# Patient Record
Sex: MALE | Race: WHITE | Employment: FULL TIME | URBAN - NONMETROPOLITAN AREA
[De-identification: names, ages, dates, MRNs, and addresses within clinical notes are randomized per-mention and may not be internally consistent; named-entity substitution may affect disease eponyms.]

---

## 2019-05-06 ENCOUNTER — OFFICE VISIT (OUTPATIENT)
Dept: FAMILY MEDICINE CLINIC | Age: 44
End: 2019-05-06
Payer: COMMERCIAL

## 2019-05-06 VITALS
DIASTOLIC BLOOD PRESSURE: 72 MMHG | HEART RATE: 70 BPM | WEIGHT: 191 LBS | SYSTOLIC BLOOD PRESSURE: 112 MMHG | OXYGEN SATURATION: 95 % | BODY MASS INDEX: 26.74 KG/M2 | RESPIRATION RATE: 16 BRPM | TEMPERATURE: 99 F | HEIGHT: 71 IN

## 2019-05-06 VITALS
BODY MASS INDEX: 27.09 KG/M2 | WEIGHT: 189.25 LBS | DIASTOLIC BLOOD PRESSURE: 70 MMHG | TEMPERATURE: 97.7 F | HEIGHT: 70 IN | HEART RATE: 70 BPM | SYSTOLIC BLOOD PRESSURE: 110 MMHG

## 2019-05-06 DIAGNOSIS — J01.00 ACUTE NON-RECURRENT MAXILLARY SINUSITIS: Primary | ICD-10-CM

## 2019-05-06 DIAGNOSIS — J21.9 ACUTE BRONCHIOLITIS DUE TO UNSPECIFIED ORGANISM: ICD-10-CM

## 2019-05-06 DIAGNOSIS — J30.2 SEASONAL ALLERGIC RHINITIS, UNSPECIFIED TRIGGER: ICD-10-CM

## 2019-05-06 PROCEDURE — 96372 THER/PROPH/DIAG INJ SC/IM: CPT | Performed by: FAMILY MEDICINE

## 2019-05-06 PROCEDURE — 99213 OFFICE O/P EST LOW 20 MIN: CPT | Performed by: FAMILY MEDICINE

## 2019-05-06 RX ORDER — FLUTICASONE PROPIONATE 50 MCG
2 SPRAY, SUSPENSION (ML) NASAL DAILY
Qty: 1 BOTTLE | Refills: 0 | Status: SHIPPED | OUTPATIENT
Start: 2019-05-06

## 2019-05-06 RX ORDER — M-VIT,TX,IRON,MINS/CALC/FOLIC 27MG-0.4MG
1 TABLET ORAL DAILY
COMMUNITY

## 2019-05-06 RX ORDER — METHYLPREDNISOLONE ACETATE 40 MG/ML
40 INJECTION, SUSPENSION INTRA-ARTICULAR; INTRALESIONAL; INTRAMUSCULAR; SOFT TISSUE ONCE
Status: COMPLETED | OUTPATIENT
Start: 2019-05-06 | End: 2019-05-06

## 2019-05-06 RX ORDER — DOXYCYCLINE HYCLATE 100 MG
100 TABLET ORAL 2 TIMES DAILY
Qty: 14 TABLET | Refills: 0 | Status: SHIPPED | OUTPATIENT
Start: 2019-05-06 | End: 2019-05-13

## 2019-05-06 RX ADMIN — METHYLPREDNISOLONE ACETATE 40 MG: 40 INJECTION, SUSPENSION INTRA-ARTICULAR; INTRALESIONAL; INTRAMUSCULAR; SOFT TISSUE at 15:31

## 2019-05-06 NOTE — PROGRESS NOTES
5/6/19    Name: Juan Maier  WFN:[id number]   Sex:male   Age:44 y.o. Subjective:  Chief Complaint   Patient presents with    Cough     sinus symptoms, headache, tired, bodyaches for 2 days     Sinus congestion, drainage, and sore throat  Dizzy  Over the counter meds not helping  Coughing but phlegm just started    ROS:  As above  No SOB  No fevers  Rest of systems negative      Current Outpatient Medications:     Multiple Vitamins-Minerals (THERAPEUTIC MULTIVITAMIN-MINERALS) tablet, Take 1 tablet by mouth daily, Disp: , Rfl:     doxycycline hyclate (VIBRA-TABS) 100 MG tablet, Take 1 tablet by mouth 2 times daily for 7 days, Disp: 14 tablet, Rfl: 0    fluticasone (FLONASE) 50 MCG/ACT nasal spray, 2 sprays by Each Nare route daily 2 Sprays in each nostril, Disp: 1 Bottle, Rfl: 0  Allergies   Allergen Reactions    Marijuana (Cannabis Sativa) Swelling    Penicillins         Vitals:    05/06/19 1506   BP: 112/72   Pulse: 70   Resp: 16   Temp: 99 °F (37.2 °C)   SpO2: 95%       EXAM:  HEENT==PERRLA, EOMI  Ears bulging and red with fluid  Posterior pharynx yellow drainage  No cervical adenopathy  Heart regular  Lungs clear  Pulses normal           Waylon was seen today for cough. Diagnoses and all orders for this visit:    Acute non-recurrent maxillary sinusitis    Seasonal allergic rhinitis, unspecified trigger    Acute bronchiolitis due to unspecified organism    Other orders  -     doxycycline hyclate (VIBRA-TABS) 100 MG tablet; Take 1 tablet by mouth 2 times daily for 7 days  -     fluticasone (FLONASE) 50 MCG/ACT nasal spray; 2 sprays by Each Nare route daily 2 Sprays in each nostril  -     methylPREDNISolone acetate (DEPO-MEDROL) injection 40 mg      Fluids  Rest and above meds    If not better in 3 to 4 days follow up    Seen by:   Jamar Barnes DO

## 2022-04-25 ENCOUNTER — HOSPITAL ENCOUNTER (EMERGENCY)
Age: 47
Discharge: HOME | End: 2022-04-25

## 2022-04-25 VITALS
BODY MASS INDEX: 27.7 KG/M2 | SYSTOLIC BLOOD PRESSURE: 131 MMHG | HEART RATE: 87 BPM | OXYGEN SATURATION: 98 % | HEIGHT: 69 IN | TEMPERATURE: 98.3 F | RESPIRATION RATE: 16 BRPM | DIASTOLIC BLOOD PRESSURE: 82 MMHG | WEIGHT: 187 LBS
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
## 2022-04-25 DIAGNOSIS — S80.862A TICK BITE OF LEFT LOWER LEG, INITIAL ENCOUNTER: Primary | ICD-10-CM

## 2022-04-25 PROCEDURE — 99283 EMERGENCY DEPT VISIT LOW MDM: CPT

## 2022-04-25 RX ORDER — DOXYCYCLINE HYCLATE 100 MG
100 TABLET ORAL 2 TIMES DAILY
Qty: 20 TABLET | Refills: 0 | Status: SHIPPED | OUTPATIENT
Start: 2022-04-25 | End: 2022-05-05

## 2022-04-26 NOTE — ED PROVIDER NOTES
[facility] [place] [place]  Department of Emergency Medicine   ED  Encounter Note  Admit Date/RoomTime: 2022  6:06 PM  ED Room: 37/    NAME: Charles Chen  : 1975  MRN: [id number]     Chief Complaint:  Wound Check (tick bite this weekend, L knee area)    History of Present Illness        Charles Chen is a 49 y.o. old male who presents to the emergency department by private vehicle, for evaluation of a tick bite of his left leg which he removed 1 day prior to arrival.  Patient thinks it attached Saturday night while he was mowing grass. Patient states today he noticed a red [place] around the bite. Patient states his symptoms are mild in severity and describes it as tender. Patient denies anything make it better or worse. Denies fever/chills, headache, vision change, dizziness, chest pain, dyspnea, abdominal pain, NVD, numbness/weakness. ROS   Pertinent positives and negatives are stated within HPI, all other systems reviewed and are negative. Past Medical History:  has no past medical history on file. Surgical History:  has no past surgical history on file. Social History:  reports that he quit smoking about 3 years ago. His smoking use included cigarettes. He has a 5.00 pack-year smoking history. He has never used smokeless tobacco.    Family History: family history is not on file. Allergies: Marijuana (cannabis sativa) and Penicillins    Physical Exam   Oxygen Saturation Interpretation: Normal.        ED Triage Vitals [22 1720]   BP Temp Temp src Pulse Resp SpO2 Height Weight   131/82 98.3 °F (36.8 °C) -- 87 16 98 % 5' 9\" (1.753 m) 187 lb (84.8 kg)         Physical Exam  Constitutional:  Alert, development consistent with age. HEENT:  NC/NT. Airway patent. Neck:  Normal ROM. Supple.   Respiratory:  Clear to auscultation and breath sounds equal.  CV:  Regular rate and rhythm, normal heart sounds, without pathological murmurs, ectopy, gallops, or rubs. GI:  Abdomen Soft, nontender, good bowel sounds. No firm or pulsatile mass. Back:  No costovertebral tenderness. Extremities: No tenderness or edema noted. Integument: tick bite to left medial thigh, small red [place] surrounding bite, no erythema migrans. No surrounding erythema, warmth, drainage. Normal turgor. Warm, dry, without visible rash, unless noted elsewhere. Lymphatics: No lymphangitis or adenopathy noted. Neurological:  Oriented. Motor functions intact. Lab / Imaging Results   (All laboratory and radiology results have been personally reviewed by myself)  Labs:  No results found for this visit on 04/25/22. Imaging: All Radiology results interpreted by Radiologist unless otherwise noted. No orders to display     ED Course / Medical Decision Making   Medications - No data to display       Consult(s):   None    Procedure(s):   none    MDM:   Patient presenting after tick bite. Patient is in no acute distress, afebrile, nontoxic appearance. Patient had a small red [place] around the tick bite but no erythema migrans at this time but will treat with 10 days of doxycycline. Recommend patient follow-up with PCP. Recommend patient return the ED with new or worsening of symptoms. Plan of Care/Counseling:  BRENDA Overton reviewed today's visit with the patient in addition to providing specific details for the plan of care and counseling regarding the diagnosis and prognosis. Questions are answered at this time and are agreeable with the plan. Assessment     1. Tick bite of left lower leg, initial encounter      Plan   Discharged home.   Patient condition is stable    New Medications     Discharge Medication List as of 4/25/2022  6:31 PM      START taking these medications    Details   doxycycline hyclate (VIBRA-TABS) 100 MG tablet Take 1 tablet by mouth 2 times daily for 10 days, Disp-20 tablet, R-0Normal           Electronically signed by BRENDA Overton   DD: 4/25/22  **This report was transcribed using voice recognition software. Every effort was made to ensure accuracy; however, inadvertent computerized transcription errors may be present.   END OF ED PROVIDER NOTE     BRENDA Overton  04/25/22 0956

## 2022-09-13 ENCOUNTER — HOSPITAL ENCOUNTER (EMERGENCY)
Age: 47
Discharge: HOME | End: 2022-09-13
Payer: OTHER GOVERNMENT

## 2022-09-13 VITALS
SYSTOLIC BLOOD PRESSURE: 145 MMHG | BODY MASS INDEX: 26.58 KG/M2 | RESPIRATION RATE: 18 BRPM | HEART RATE: 65 BPM | WEIGHT: 180 LBS | DIASTOLIC BLOOD PRESSURE: 92 MMHG | TEMPERATURE: 98 F | OXYGEN SATURATION: 99 %

## 2022-09-13 DIAGNOSIS — R03.0 ELEVATED BLOOD PRESSURE READING: ICD-10-CM

## 2022-09-13 DIAGNOSIS — L73.9 FOLLICULITIS: Primary | ICD-10-CM

## 2022-09-13 LAB
BACTERIA: NORMAL /HPF
BILIRUBIN URINE: NEGATIVE
BLOOD, URINE: NEGATIVE
CLARITY: CLEAR
COLOR: YELLOW
GLUCOSE URINE: NEGATIVE MG/DL
KETONES, URINE: NEGATIVE MG/DL
LEUKOCYTE ESTERASE, URINE: NEGATIVE
NITRITE, URINE: NEGATIVE
PH UA: 7 (ref 5–9)
PROTEIN UA: NEGATIVE MG/DL
RBC UA: NORMAL /HPF (ref 0–2)
SPECIFIC GRAVITY UA: <=1.005 (ref 1–1.03)
UROBILINOGEN, URINE: 0.2 E.U./DL
WBC UA: NORMAL /HPF (ref 0–5)

## 2022-09-13 PROCEDURE — 87088 URINE BACTERIA CULTURE: CPT

## 2022-09-13 PROCEDURE — 99283 EMERGENCY DEPT VISIT LOW MDM: CPT

## 2022-09-13 PROCEDURE — 87491 CHLMYD TRACH DNA AMP PROBE: CPT

## 2022-09-13 PROCEDURE — 81001 URINALYSIS AUTO W/SCOPE: CPT

## 2022-09-13 PROCEDURE — 87591 N.GONORRHOEAE DNA AMP PROB: CPT

## 2022-09-13 RX ORDER — DOXYCYCLINE HYCLATE 100 MG
100 TABLET ORAL 2 TIMES DAILY
Qty: 14 TABLET | Refills: 0 | Status: SHIPPED | OUTPATIENT
Start: 2022-09-13 | End: 2022-09-20

## 2022-09-13 ASSESSMENT — PAIN - FUNCTIONAL ASSESSMENT
PAIN_FUNCTIONAL_ASSESSMENT: 0-10
PAIN_FUNCTIONAL_ASSESSMENT: NONE - DENIES PAIN

## 2022-09-13 NOTE — ED PROVIDER NOTES
[facility]     Department of Emergency Medicine   ED  Encounter Note  Admit Date/RoomTime: 2022  2:01 PM  ED Room: 35/35    NAME: Zenaida Dutton  : 1975  MRN: [id number]     Chief Complaint:  Other (States he has a \"lump\" on his penis. First noticed 2 WKS ago. Burning)    History of Present Illness        Zenaida Dutton is a 49 y.o. old male who presents to the emergency department by private vehicle, for evaluation of lesion on the side of his penis located on shaft of his penis, which occured 2 week(s) prior to arrival.  The patient has received no prior treatment regarding the presenting complaint PTA. Pinky Angles The wound is / has clean, dry, open, erythema, and tender. Patient states that he is not sexually active and has no concern for STDs. ROS   Pertinent positives and negatives are stated within HPI, all other systems reviewed and are negative. Past Medical History:  has no past medical history on file. Surgical History:  has no past surgical history on file. Social History:  reports that he quit smoking about 4 years ago. His smoking use included cigarettes. He has a 5.00 pack-year smoking history. He has never used smokeless tobacco.    Family History: family history is not on file. Allergies: Marijuana (cannabis sativa) and Penicillins    Physical Exam   Oxygen Saturation Interpretation: Normal.        ED Triage Vitals [22 1358]   BP Temp Temp src Heart Rate Resp SpO2 Height Weight   (!) 145/92 98 °F (36.7 °C) -- 65 18 99 % -- 180 lb (81.6 kg)       Physical Exam  Constitutional:  Alert, development consistent with age. HEENT:  NC/NT. Airway patent. Neck:  Normal ROM. Supple. Respiratory:  normal respiratory effort. CV:  Regular rate and rhythm  Extremities: No tenderness or edema noted. Integument:  Normal turgor. See clinical image below, the area is raised and not ulcerated. No drainage. Lymphatics: No lymphangitis or adenopathy noted. Neurological:  Oriented. Motor functions intact. **Informed Consent**    The patient has given verbal consent to have photos taken of shaft of his penis and electronically inserted into their ED Provider Note as part of their permanent medical record for purposes of illustration, documentation, treatment management and/or medical review. All Images taken on 9/13/22 of patient name: Joon Palumbo were taken by a [address] [place] [address],[address] approved registered mobile device via [employer] mobile application and transmitted then stored on a secured [address] located within [facility]. Lab / Imaging Results   (All laboratory and radiology results have been personally reviewed by myself)  Labs:  Results for orders placed or performed during the hospital encounter of 09/13/22   C.trachomatis N.gonorrhoeae DNA, Urine    Specimen: Urine   Result Value Ref Range    Source Urine    Urinalysis with Microscopic   Result Value Ref Range    Color, UA Yellow Straw/Yellow    Clarity, UA Clear Clear    Glucose, Ur Negative Negative mg/dL    Bilirubin Urine Negative Negative    Ketones, Urine Negative Negative mg/dL    Specific Gravity, UA <=1.005 1.005 - 1.030    Blood, Urine Negative Negative    pH, UA 7.0 5.0 - 9.0    Protein, UA Negative Negative mg/dL    Urobilinogen, Urine 0.2 <2.0 E.U./dL    Nitrite, Urine Negative Negative    Leukocyte Esterase, Urine Negative Negative    WBC, UA NONE 0 - 5 /HPF    RBC, UA NONE 0 - 2 /HPF    Bacteria, UA NONE SEEN None Seen /HPF     Imaging: All Radiology results interpreted by Radiologist unless otherwise noted. No orders to display     ED Course / Medical Decision Making   Medications - No data to display     Re-examination:  9/13/22       Time: 1600   Results discussed. Consult(s):   None    Procedure(s):   None    MDM:   Patient presents to the emergency room for penile lesion. Patient is not sexually active and has no concern for STDs.   The area is painful and not ulcerated, low suspicion for syphilis. The lesion is in the hairline and I do suspect folliculitis. Will be given the prescription below and should follow-up with his [address]. Strict return precautions were discussed and he should come back immediately for new or worsening symptoms. Plan of Care/Counseling:  Jason Castelan PA-C reviewed today's visit with the patient in addition to providing specific details for the plan of care and counseling regarding the diagnosis and prognosis. Questions are answered at this time and are agreeable with the plan. Assessment     1. Folliculitis    2. Elevated blood pressure reading      Plan   Discharged home. Patient condition is good    New Medications     New Prescriptions    DOXYCYCLINE HYCLATE (VIBRA-TABS) 100 MG TABLET    Take 1 tablet by mouth 2 times daily for 7 days     Electronically signed by Jason Castelan PA-C   DD: 9/13/22  **This report was transcribed using voice recognition software. Every effort was made to ensure accuracy; however, inadvertent computerized transcription errors may be present.   END OF ED PROVIDER NOTE        Jason Castelan PA-C  09/13/22 3396

## 2022-09-13 NOTE — Clinical Note
Jose King was seen and treated in our emergency department on 9/13/2022. He may return to work on 09/14/2022. If you have any questions or concerns, please don't hesitate to call.       Cordell PressBRENDA curtis

## 2022-09-13 NOTE — ED NOTES
Department of Emergency Medicine    FIRST PROVIDER TRIAGE NOTE             [facility]           9/13/22  1:58 PM EDT    Date of Encounter: 9/13/22   MRN: [id number]    Vitals:    09/13/22 1358   BP: (!) 145/92   Pulse: 65   Resp: 18   Temp: 98 °F (36.7 °C)   SpO2: 99%   Weight: 180 lb (81.6 kg)      HPI: Werner Amaral is a 49 y.o. male who presents to the ED for Other (States he has a \"lump\" on his penis. First noticed 2 WKS ago. Burning)     Reports burning with urination, denies visible hematuria     Denies possibility of STDS- no sexually active at this time    ROS: Negative for fever, abd pain, or N/V    Physical Exam:   Gen Appearance/Constitutional: alert     Initial Plan of Care: All treatment areas with department are currently occupied. Plan to order/Initiate the following while awaiting opening in ED: labs.     Initial Plan of Care: Initiate Treatment-Testing, Proceed toTreatment Area When Bed Available for ED Attending/MLP to Continue Care    Electronically signed by Yesica You PA-C   DD: 9/13/22       Yesica You PA-C  09/13/22 1400

## 2022-09-15 LAB — URINE CULTURE, ROUTINE: NORMAL

## 2022-09-16 LAB
C. TRACHOMATIS DNA ,URINE: NEGATIVE
N. GONORRHOEAE DNA, URINE: NEGATIVE
SOURCE: NORMAL